# Patient Record
Sex: MALE | Race: WHITE | NOT HISPANIC OR LATINO | Employment: FULL TIME | URBAN - METROPOLITAN AREA
[De-identification: names, ages, dates, MRNs, and addresses within clinical notes are randomized per-mention and may not be internally consistent; named-entity substitution may affect disease eponyms.]

---

## 2024-02-14 ENCOUNTER — HOSPITAL ENCOUNTER (EMERGENCY)
Facility: HOSPITAL | Age: 24
Discharge: HOME | End: 2024-02-14
Payer: COMMERCIAL

## 2024-02-14 ENCOUNTER — APPOINTMENT (OUTPATIENT)
Dept: RADIOLOGY | Facility: HOSPITAL | Age: 24
End: 2024-02-14
Payer: COMMERCIAL

## 2024-02-14 ENCOUNTER — PHARMACY VISIT (OUTPATIENT)
Dept: PHARMACY | Facility: CLINIC | Age: 24
End: 2024-02-14
Payer: COMMERCIAL

## 2024-02-14 VITALS
TEMPERATURE: 98.6 F | BODY MASS INDEX: 22.5 KG/M2 | HEART RATE: 88 BPM | HEIGHT: 66 IN | DIASTOLIC BLOOD PRESSURE: 88 MMHG | WEIGHT: 140 LBS | OXYGEN SATURATION: 100 % | RESPIRATION RATE: 16 BRPM | SYSTOLIC BLOOD PRESSURE: 130 MMHG

## 2024-02-14 DIAGNOSIS — S61.209A AVULSION OF FINGERTIP, INITIAL ENCOUNTER: Primary | ICD-10-CM

## 2024-02-14 DIAGNOSIS — Z23 ENCOUNTER FOR ADMINISTRATION OF VACCINE: ICD-10-CM

## 2024-02-14 DIAGNOSIS — S61.303A AVULSION OF NAIL OF LEFT MIDDLE FINGER: ICD-10-CM

## 2024-02-14 PROCEDURE — 99284 EMERGENCY DEPT VISIT MOD MDM: CPT | Mod: 25

## 2024-02-14 PROCEDURE — 99283 EMERGENCY DEPT VISIT LOW MDM: CPT | Mod: 25

## 2024-02-14 PROCEDURE — 73140 X-RAY EXAM OF FINGER(S): CPT | Mod: LEFT SIDE | Performed by: RADIOLOGY

## 2024-02-14 PROCEDURE — RXMED WILLOW AMBULATORY MEDICATION CHARGE

## 2024-02-14 PROCEDURE — 2500000001 HC RX 250 WO HCPCS SELF ADMINISTERED DRUGS (ALT 637 FOR MEDICARE OP): Performed by: NURSE PRACTITIONER

## 2024-02-14 PROCEDURE — 90715 TDAP VACCINE 7 YRS/> IM: CPT | Performed by: NURSE PRACTITIONER

## 2024-02-14 PROCEDURE — 2500000005 HC RX 250 GENERAL PHARMACY W/O HCPCS

## 2024-02-14 PROCEDURE — 73140 X-RAY EXAM OF FINGER(S): CPT | Mod: LT

## 2024-02-14 PROCEDURE — 90471 IMMUNIZATION ADMIN: CPT | Performed by: NURSE PRACTITIONER

## 2024-02-14 PROCEDURE — 2500000004 HC RX 250 GENERAL PHARMACY W/ HCPCS (ALT 636 FOR OP/ED): Performed by: NURSE PRACTITIONER

## 2024-02-14 RX ORDER — BACITRACIN ZINC 500 UNIT/G
OINTMENT IN PACKET (EA) TOPICAL ONCE
Status: COMPLETED | OUTPATIENT
Start: 2024-02-14 | End: 2024-02-14

## 2024-02-14 RX ORDER — CEPHALEXIN 500 MG/1
500 CAPSULE ORAL 4 TIMES DAILY
Qty: 28 CAPSULE | Refills: 0 | Status: SHIPPED | OUTPATIENT
Start: 2024-02-14 | End: 2024-02-21

## 2024-02-14 RX ORDER — IBUPROFEN 600 MG/1
600 TABLET ORAL EVERY 6 HOURS PRN
Qty: 20 TABLET | Refills: 0 | Status: SHIPPED | OUTPATIENT
Start: 2024-02-14 | End: 2024-02-19

## 2024-02-14 RX ADMIN — IBUPROFEN 600 MG: 200 TABLET, FILM COATED ORAL at 13:58

## 2024-02-14 RX ADMIN — BACITRACIN 1 APPLICATION: 500 OINTMENT TOPICAL at 13:58

## 2024-02-14 RX ADMIN — TETANUS TOXOID, REDUCED DIPHTHERIA TOXOID AND ACELLULAR PERTUSSIS VACCINE, ADSORBED 0.5 ML: 5; 2.5; 8; 8; 2.5 SUSPENSION INTRAMUSCULAR at 13:58

## 2024-02-14 RX ADMIN — Medication 1 EACH: at 14:00

## 2024-02-14 ASSESSMENT — COLUMBIA-SUICIDE SEVERITY RATING SCALE - C-SSRS
6. HAVE YOU EVER DONE ANYTHING, STARTED TO DO ANYTHING, OR PREPARED TO DO ANYTHING TO END YOUR LIFE?: NO
2. HAVE YOU ACTUALLY HAD ANY THOUGHTS OF KILLING YOURSELF?: NO
1. IN THE PAST MONTH, HAVE YOU WISHED YOU WERE DEAD OR WISHED YOU COULD GO TO SLEEP AND NOT WAKE UP?: NO

## 2024-02-14 ASSESSMENT — VISUAL ACUITY: OU: 1

## 2024-02-14 NOTE — ED PROVIDER NOTES
"HPI   Chief Complaint   Patient presents with    Finger Laceration     Middle left finger got lacerated by a vet . A third of the finger tip is missing and collected in a specimen bag with ice.        Patient presents the emergency department for evaluation of a fingertip injury that occurred just prior to arrival.  Patient is left-hand dominant and was using a device similar to a  to open a plastic bottle when he accidentally lacerated his fingertip.  He denies any limited range of motion of his finger secondary to his injury.  He has been able to control bleeding with direct pressure.  He denies being a diabetic or on any blood thinners but admits to needing a tetanus vaccine as he has not had 1 in the last 10 years.  He states he did not hit the bone.  He has not taken any over-the-counter medication for discomfort prior to arrival which is aggravated by bumping his finger.  His symptoms are moderate in severity and persistent nature.      History provided by:  Patient   used: No      INFORMED PHOTO CONSENT:    The patient has given verbal consent to have photos taken of left 3rd finger and inserted into their provider note as a part of their permanent medical record for purposes of documentation, treatment management, and/or medical review. All images taken were transmitted and stored on a secure Epic  Site located within a Media Folder Tab by a registered Epic-[employer] Mobile Application Device. See \"Media\" tab in Epic or photo as below.                    Pavel Coma Scale Score: 15                     Patient History   Past Medical History:   Diagnosis Date    Acute pharyngitis, unspecified 09/22/2016    Sore throat    Enteroviral vesicular stomatitis with exanthem 09/22/2016    Hand, foot and mouth disease    Personal history of other diseases of the respiratory system 08/31/2017    History of asthma    Personal history of other diseases of urinary system     " History of hematuria    Personal history of other specified conditions 08/04/2017    History of dysuria    Personal history of other specified conditions 08/04/2017    History of gross hematuria    Unspecified asthma with (acute) exacerbation 02/12/2015    Asthma with exacerbation    Unspecified complication of genitourinary prosthetic device, implant and graft, sequela 08/31/2017    Complication of Estrada catheter, sequela     No past surgical history on file.  No family history on file.  Social History     Tobacco Use    Smoking status: Not on file    Smokeless tobacco: Not on file   Substance Use Topics    Alcohol use: Not on file    Drug use: Not on file       Physical Exam   ED Triage Vitals [02/14/24 1158]   Temperature Heart Rate Respirations BP   37 °C (98.6 °F) 89 17 (!) 149/96      Pulse Ox Temp Source Heart Rate Source Patient Position   100 % Temporal Monitor Sitting      BP Location FiO2 (%)     Left arm --       Physical Exam  Vitals reviewed.   Constitutional:       Appearance: Normal appearance.      Comments: Bloody dressing noticed to the left middle finger.   HENT:      Head: Normocephalic and atraumatic.      Right Ear: Hearing normal.      Left Ear: Hearing normal.      Nose: Nose normal.      Mouth/Throat:      Lips: Pink.      Mouth: Mucous membranes are moist.   Eyes:      General: Vision grossly intact.   Cardiovascular:      Rate and Rhythm: Normal rate and regular rhythm.      Pulses:           Radial pulses are 2+ on the left side.   Pulmonary:      Effort: Pulmonary effort is normal.      Breath sounds: Normal breath sounds.   Musculoskeletal:      Cervical back: Normal range of motion and neck supple.      Comments: Strong left radial and ulnar pulses.  Normal capillary refill.  Full sensation and motor movement intact along the radial, median and ulnar nerve distribution. Neurovascularly intact with compartments soft.  There is a 1.5 cm x 1.3 cm skin avulsion to the radial aspect of the  left third long finger with oozing of blood.  There is nail involvement with the nail seated well in the eponychium.  Patient has tenderness to palpation of this area.  There is no obvious bone exposure as depicted below.  Otherwise he does not have any bony tenderness to the rest of the finger or throughout the other fingers hand or wrist.  Otherwise full range of motion and strong against resistance with flexion, extension, abduction and adduction of the long finger.     Skin:     General: Skin is warm and dry.      Capillary Refill: Capillary refill takes less than 2 seconds.      Findings: Laceration (left 3rd fingertip avulsion) present.   Neurological:      Mental Status: He is alert and oriented to person, place, and time.   Psychiatric:         Behavior: Behavior is cooperative.         ED Course & MDM   ED Course as of 02/14/24 2040 Wed Feb 14, 2024   1417 Bedside wound irrigation, application of Surgifoam with pressure dressing, metal finger splint and elevation with ice pack as performed by myself.  Will reevaluate the wound for saturation of the dressing prior to discharge.  Wet read of the x-ray on x-ray machine completed by myself does not show obvious bone involvement. [NA]   1431 Patient's wound rechecked and there is no evidence of bleeding through his dressing.  He was reinstructed on his [facility] documentation for completion so he may be discharged. [NA]   1527 To check patient 1 last time and he did start bleeding through his dressing.  I went to reinforce the dressing in which the Surgifoam, 2 x 2 and first couple layers of Ace wrap are saturated.  I left the Surgifoam in place and reinforced with a second surgical foam, 2 x 2's, 4 x 4 and new Ace wrap pressure dressing with the metal finger splint.  Placed patient in recliner G with hand elevated and ice applied.  Discharge held for reevaluation of the wound. [NA]   1540 Wound check shows no bleeding through the second dressing.  Patient's hand  was taken down from elevation and ice removed.  Will recheck in 15 minutes to ensure not bleeding prior to discharge. [NA]   7035 Patient is verbalizing readiness for discharge.  He does not appear to have any bleeding through his dressing.  He is aware signs and symptoms to watch for and is discharged. [NA]      ED Course User Index  [NA] Charlene Martinez, APRN-CNP         Diagnoses as of 02/14/24 2040   Avulsion of fingertip, initial encounter   Avulsion of nail of left middle finger   Encounter for administration of vaccine       Medical Decision Making  Patient presents for evaluation of a wound. Bleeding is controlled and patient is not on anticoagulation. There is clinical evidence warranting diagnostic imaging as there is no tendon injury and no foreign body sensation however do need to evaluate proximity to the bone. Tetanus vaccination is not up to date. Plan is for tetanus booster, dose of ibuprofen, x-ray of the finger and to reestablish bleeding control after irrigation of the wound, cleansing and application of a Surgifoam bacitracin dressing with pressure dressing and metal finger splint.  Patient provides consent for this plan.    X-ray as interpreted by radiologist shows no acute bony abnormality or foreign body.    Wound care and dressing as documented below.  Plan is for cephalexin 4 times daily for the next 7 days and wound care referral was placed.  Patient to be evaluated by wound care first and then to follow-up with occupational medicine.  He was given work restrictions and his first reported injury was completed.  He will leave his quick clot dressing on for 48 hours and if not seen by wound care, he was sent with Surgifoam for repeat dressing change as he witnessed should he need to complete it again.  Patient is aware of S/S indicative of re-evaluation in the ER setting. Patient verbalizes understanding of instructions and departed in stable condition with no social determinants of health  that would obscure this treatment plan.           Procedure    TIME: 1415    WOUND CARE    PERFORMED BY: MICH WALKER CNP  CONSENT: The risks and benefits of the procedure were explained to the patient.  Verbal consent was obtained.    LOCATION: left middle finger  LENGTH: 1.5 cm x 1.3 cm    Anesthesia was not necessary and the patient was given ibuprofen for discomfort.  The area was irrigated with a copious amount of sterile saline.  There was no evidence of tendon injury or foreign body.  The site was then dressed with a bacitracin Surgifoam dressing with 2 x 2's over top secured with a pressure Ace wrap at the tip then loosely secured metal finger splint to the rest of the finger and hand.  The patient tolerated this well.  The hand was then elevated with an ice pack applied.  To be reevaluated with rechecks as documented under ED course.       JOSEPHINE Mcguire-SHARATH  02/14/24 2040

## 2024-02-14 NOTE — DISCHARGE INSTRUCTIONS
Leave your dressing on for the next 48 hours.  Should you not have an appointment to be rechecked by 5 PM on February 16, remove the dressing, wash your hands with soap and water, pat dry and apply an over-the-counter topical antibiotic cream and new nonstick dressing.  Should it bleed like it previously was, you may cut a small piece of the foam dressing and wrap as we did in the emergency department.    WOUND CARE PATIENT INSTRUCTIONS:    GENERAL INFORMATION:   A wound is a break in the skin.  There are several types of wounds.  Abrasions occur when the outer layer of the skin is rubbed or scraped off.  Lacerations are cuts in the skin.  Puncture wounds are holes that are made by round, sharp objects such as needles or nails.  It may have been necessary to close the wound with stitches (sutures) to speed healing and to prevent infection.  Using stitches also will decrease the amount of scarring.    INSTRUCTIONS:   1.  Wash the area with soap and water twice daily, pat it dry.    2.  Apply antibiotic ointment to the area twice daily after cleansing.    3.  Cover with a Band-Aid or sterile dressing.      CONTACT YOUR DOCTOR OR GOTO THE EMERGENCY DEPARTMENT IF:  1. You have a temperature over 100.4 F (38 C).    2. You have signs of infection such as increasing pain or soreness, swelling, redness, pus, a foul smell, or red   streaks coming from the injured site.    3. You have numbness or swelling below the wound, or you can't move the joint below.     INFORMATION FROM UP TO DATE - ALL RIGHTS RESERVED

## 2024-02-20 ENCOUNTER — OFFICE VISIT (OUTPATIENT)
Dept: WOUND CARE | Facility: CLINIC | Age: 24
End: 2024-02-20
Payer: COMMERCIAL

## 2024-02-20 PROCEDURE — 99213 OFFICE O/P EST LOW 20 MIN: CPT | Mod: 25

## 2024-02-20 PROCEDURE — 11042 DBRDMT SUBQ TIS 1ST 20SQCM/<: CPT | Mod: ZK

## 2024-02-20 PROCEDURE — 11042 DBRDMT SUBQ TIS 1ST 20SQCM/<: CPT | Performed by: CLINICAL NURSE SPECIALIST

## 2024-02-20 PROCEDURE — 99203 OFFICE O/P NEW LOW 30 MIN: CPT | Performed by: CLINICAL NURSE SPECIALIST

## 2024-02-27 ENCOUNTER — OFFICE VISIT (OUTPATIENT)
Dept: WOUND CARE | Facility: CLINIC | Age: 24
End: 2024-02-27
Payer: COMMERCIAL

## 2024-02-27 PROCEDURE — 11042 DBRDMT SUBQ TIS 1ST 20SQCM/<: CPT | Performed by: CLINICAL NURSE SPECIALIST

## 2024-02-27 PROCEDURE — 11042 DBRDMT SUBQ TIS 1ST 20SQCM/<: CPT | Mod: ZK

## 2024-03-05 ENCOUNTER — OFFICE VISIT (OUTPATIENT)
Dept: WOUND CARE | Facility: CLINIC | Age: 24
End: 2024-03-05
Payer: COMMERCIAL

## 2024-03-05 PROCEDURE — 11042 DBRDMT SUBQ TIS 1ST 20SQCM/<: CPT | Performed by: CLINICAL NURSE SPECIALIST

## 2024-03-05 PROCEDURE — 11042 DBRDMT SUBQ TIS 1ST 20SQCM/<: CPT | Mod: ZK

## 2024-03-12 ENCOUNTER — OFFICE VISIT (OUTPATIENT)
Dept: WOUND CARE | Facility: CLINIC | Age: 24
End: 2024-03-12
Payer: COMMERCIAL

## 2024-03-12 PROCEDURE — 99212 OFFICE O/P EST SF 10 MIN: CPT

## 2024-03-12 PROCEDURE — 99212 OFFICE O/P EST SF 10 MIN: CPT | Performed by: CLINICAL NURSE SPECIALIST

## 2024-03-19 ENCOUNTER — APPOINTMENT (OUTPATIENT)
Dept: WOUND CARE | Facility: CLINIC | Age: 24
End: 2024-03-19
Payer: COMMERCIAL

## 2024-10-08 ENCOUNTER — APPOINTMENT (OUTPATIENT)
Dept: URGENT CARE | Age: 24
End: 2024-10-08

## 2025-02-06 ENCOUNTER — APPOINTMENT (OUTPATIENT)
Dept: RADIOLOGY | Facility: HOSPITAL | Age: 25
End: 2025-02-06

## 2025-02-06 ENCOUNTER — HOSPITAL ENCOUNTER (EMERGENCY)
Facility: HOSPITAL | Age: 25
Discharge: HOME | End: 2025-02-06

## 2025-02-06 VITALS
TEMPERATURE: 97.7 F | RESPIRATION RATE: 16 BRPM | SYSTOLIC BLOOD PRESSURE: 110 MMHG | HEART RATE: 58 BPM | HEIGHT: 66 IN | DIASTOLIC BLOOD PRESSURE: 70 MMHG | WEIGHT: 130 LBS | BODY MASS INDEX: 20.89 KG/M2 | OXYGEN SATURATION: 99 %

## 2025-02-06 DIAGNOSIS — R10.84 GENERALIZED ABDOMINAL PAIN: Primary | ICD-10-CM

## 2025-02-06 LAB
ALBUMIN SERPL BCP-MCNC: 5.1 G/DL (ref 3.4–5)
ALP SERPL-CCNC: 59 U/L (ref 33–120)
ALT SERPL W P-5'-P-CCNC: 16 U/L (ref 10–52)
ANION GAP SERPL CALC-SCNC: 12 MMOL/L (ref 10–20)
AST SERPL W P-5'-P-CCNC: 18 U/L (ref 9–39)
BASOPHILS # BLD AUTO: 0.02 X10*3/UL (ref 0–0.1)
BASOPHILS NFR BLD AUTO: 0.4 %
BILIRUB SERPL-MCNC: 1.5 MG/DL (ref 0–1.2)
BUN SERPL-MCNC: 17 MG/DL (ref 6–23)
CALCIUM SERPL-MCNC: 9.8 MG/DL (ref 8.6–10.3)
CHLORIDE SERPL-SCNC: 101 MMOL/L (ref 98–107)
CO2 SERPL-SCNC: 29 MMOL/L (ref 21–32)
CREAT SERPL-MCNC: 0.94 MG/DL (ref 0.5–1.3)
EGFRCR SERPLBLD CKD-EPI 2021: >90 ML/MIN/1.73M*2
EOSINOPHIL # BLD AUTO: 0.12 X10*3/UL (ref 0–0.7)
EOSINOPHIL NFR BLD AUTO: 2.4 %
ERYTHROCYTE [DISTWIDTH] IN BLOOD BY AUTOMATED COUNT: 12.1 % (ref 11.5–14.5)
GLUCOSE SERPL-MCNC: 72 MG/DL (ref 74–99)
HCT VFR BLD AUTO: 46.9 % (ref 41–52)
HGB BLD-MCNC: 16.4 G/DL (ref 13.5–17.5)
IMM GRANULOCYTES # BLD AUTO: 0 X10*3/UL (ref 0–0.7)
IMM GRANULOCYTES NFR BLD AUTO: 0 % (ref 0–0.9)
LACTATE SERPL-SCNC: 0.6 MMOL/L (ref 0.4–2)
LIPASE SERPL-CCNC: 20 U/L (ref 9–82)
LYMPHOCYTES # BLD AUTO: 1.52 X10*3/UL (ref 1.2–4.8)
LYMPHOCYTES NFR BLD AUTO: 29.8 %
MCH RBC QN AUTO: 29.2 PG (ref 26–34)
MCHC RBC AUTO-ENTMCNC: 35 G/DL (ref 32–36)
MCV RBC AUTO: 84 FL (ref 80–100)
MONOCYTES # BLD AUTO: 0.36 X10*3/UL (ref 0.1–1)
MONOCYTES NFR BLD AUTO: 7.1 %
NEUTROPHILS # BLD AUTO: 3.08 X10*3/UL (ref 1.2–7.7)
NEUTROPHILS NFR BLD AUTO: 60.3 %
NRBC BLD-RTO: 0 /100 WBCS (ref 0–0)
PLATELET # BLD AUTO: 236 X10*3/UL (ref 150–450)
POTASSIUM SERPL-SCNC: 3.7 MMOL/L (ref 3.5–5.3)
PROT SERPL-MCNC: 7.5 G/DL (ref 6.4–8.2)
RBC # BLD AUTO: 5.61 X10*6/UL (ref 4.5–5.9)
SODIUM SERPL-SCNC: 138 MMOL/L (ref 136–145)
WBC # BLD AUTO: 5.1 X10*3/UL (ref 4.4–11.3)

## 2025-02-06 PROCEDURE — 74177 CT ABD & PELVIS W/CONTRAST: CPT

## 2025-02-06 PROCEDURE — 74177 CT ABD & PELVIS W/CONTRAST: CPT | Performed by: RADIOLOGY

## 2025-02-06 PROCEDURE — 83605 ASSAY OF LACTIC ACID: CPT | Performed by: NURSE PRACTITIONER

## 2025-02-06 PROCEDURE — 36415 COLL VENOUS BLD VENIPUNCTURE: CPT | Performed by: NURSE PRACTITIONER

## 2025-02-06 PROCEDURE — 99285 EMERGENCY DEPT VISIT HI MDM: CPT | Mod: 25

## 2025-02-06 PROCEDURE — 2550000001 HC RX 255 CONTRASTS: Performed by: NURSE PRACTITIONER

## 2025-02-06 PROCEDURE — 83690 ASSAY OF LIPASE: CPT | Performed by: NURSE PRACTITIONER

## 2025-02-06 PROCEDURE — 80053 COMPREHEN METABOLIC PANEL: CPT | Performed by: NURSE PRACTITIONER

## 2025-02-06 PROCEDURE — 85025 COMPLETE CBC W/AUTO DIFF WBC: CPT | Performed by: NURSE PRACTITIONER

## 2025-02-06 RX ADMIN — IOHEXOL 75 ML: 350 INJECTION, SOLUTION INTRAVENOUS at 12:08

## 2025-02-06 ASSESSMENT — LIFESTYLE VARIABLES
HAVE PEOPLE ANNOYED YOU BY CRITICIZING YOUR DRINKING: NO
EVER FELT BAD OR GUILTY ABOUT YOUR DRINKING: NO
EVER HAD A DRINK FIRST THING IN THE MORNING TO STEADY YOUR NERVES TO GET RID OF A HANGOVER: NO
TOTAL SCORE: 0
HAVE YOU EVER FELT YOU SHOULD CUT DOWN ON YOUR DRINKING: NO

## 2025-02-06 ASSESSMENT — PAIN SCALES - GENERAL: PAINLEVEL_OUTOF10: 0 - NO PAIN

## 2025-02-06 ASSESSMENT — PAIN - FUNCTIONAL ASSESSMENT: PAIN_FUNCTIONAL_ASSESSMENT: 0-10

## 2025-02-06 NOTE — ED PROVIDER NOTES
Chief Complaint   Patient presents with   • Rectal Bleeding       HPI       24 year old male presents to the Emergency Department today complaining of a 2 year history of intermittent diffuse abdominal pain that he describes as a discomfort, and varies in intensity. Notes that that he has had nausea and hematochezia associated with the above. Denies any associated fever, chills, headache, neck pain, chest pain, shortness of breath, vomiting, diarrhea, constipation, hematemesis, melena, or urinary symptoms.       History provided by:  Patient             Patient History   Past Medical History:   Diagnosis Date   • Acute pharyngitis, unspecified 09/22/2016    Sore throat   • Enteroviral vesicular stomatitis with exanthem 09/22/2016    Hand, foot and mouth disease   • Personal history of other diseases of the respiratory system 08/31/2017    History of asthma   • Personal history of other diseases of urinary system     History of hematuria   • Personal history of other specified conditions 08/04/2017    History of dysuria   • Personal history of other specified conditions 08/04/2017    History of gross hematuria   • Unspecified asthma with (acute) exacerbation ([facility]) 02/12/2015    Asthma with exacerbation   • Unspecified complication of genitourinary prosthetic device, implant and graft, sequela 08/31/2017    Complication of Estrada catheter, sequela     No past surgical history on file.  No family history on file.  Social History     Tobacco Use   • Smoking status: Not on file   • Smokeless tobacco: Not on file   Substance Use Topics   • Alcohol use: Not on file   • Drug use: Not on file           Physical Exam  Constitutional:       Appearance: Normal appearance.   HENT:      Head: Normocephalic.      Right Ear: Tympanic membrane, ear canal and external ear normal.      Left Ear: Tympanic membrane, ear canal and external ear normal.      Nose: Nose normal.      Mouth/Throat:      Mouth: Mucous membranes are moist.       Pharynx: Oropharynx is clear. No oropharyngeal exudate or posterior oropharyngeal erythema.   Eyes:      Conjunctiva/sclera: Conjunctivae normal.      Pupils: Pupils are equal, round, and reactive to light.   Cardiovascular:      Rate and Rhythm: Normal rate and regular rhythm.      Pulses:           Radial pulses are 3+ on the right side and 3+ on the left side.        Dorsalis pedis pulses are 3+ on the right side and 3+ on the left side.      Heart sounds: Normal heart sounds. No murmur heard.     No friction rub. No gallop.   Pulmonary:      Effort: Pulmonary effort is normal. No respiratory distress.      Breath sounds: Normal breath sounds. No wheezing, rhonchi or rales.   Abdominal:      General: Abdomen is flat. Bowel sounds are normal.      Palpations: Abdomen is soft.      Tenderness: There is no abdominal tenderness. There is no right CVA tenderness, left CVA tenderness, guarding or rebound. Negative signs include Marc's sign and McBurney's sign.   Musculoskeletal:         General: No swelling or deformity.      Cervical back: Full passive range of motion without pain.      Right lower leg: No edema.      Left lower leg: No edema.   Lymphadenopathy:      Cervical: No cervical adenopathy.   Skin:     Capillary Refill: Capillary refill takes less than 2 seconds.      Coloration: Skin is not jaundiced.      Findings: No rash.   Neurological:      General: No focal deficit present.      Mental Status: He is alert and oriented to person, place, and time. Mental status is at baseline.      Gait: Gait is intact.   Psychiatric:         Mood and Affect: Mood normal.         Behavior: Behavior is cooperative.         Labs Reviewed   COMPREHENSIVE METABOLIC PANEL - Abnormal       Result Value    Glucose 72 (*)     Sodium 138      Potassium 3.7      Chloride 101      Bicarbonate 29      Anion Gap 12      Urea Nitrogen 17      Creatinine 0.94      eGFR >90      Calcium 9.8      Albumin 5.1 (*)     Alkaline  Phosphatase 59      Total Protein 7.5      AST 18      Bilirubin, Total 1.5 (*)     ALT 16     LIPASE - Normal    Lipase 20      Narrative:     Venipuncture immediately after or during the administration of Metamizole may lead to falsely low results. Testing should be performed immediately prior to Metamizole dosing.   LACTATE - Normal    Lactate 0.6      Narrative:     Venipuncture immediately after or during the administration of Metamizole may lead to falsely low results. Testing should be performed immediately prior to Metamizole dosing.   CBC WITH AUTO DIFFERENTIAL    WBC 5.1      nRBC 0.0      RBC 5.61      Hemoglobin 16.4      Hematocrit 46.9      MCV 84      MCH 29.2      MCHC 35.0      RDW 12.1      Platelets 236      Neutrophils % 60.3      Immature Granulocytes %, Automated 0.0      Lymphocytes % 29.8      Monocytes % 7.1      Eosinophils % 2.4      Basophils % 0.4      Neutrophils Absolute 3.08      Immature Granulocytes Absolute, Automated 0.00      Lymphocytes Absolute 1.52      Monocytes Absolute 0.36      Eosinophils Absolute 0.12      Basophils Absolute 0.02         CT abdomen pelvis w IV contrast   Final Result   Small right renal cyst.        Mild diffuse retained colonic stool.        Remainder of the exam was negative.        MACRO:   None        Signed by: Akhil Johnson 2/6/2025 12:32 PM   Dictation workstation:   [id number]               ED Course & MDM   Diagnoses as of 02/06/25 1316   Generalized abdominal pain           Medical Decision Making  Patient was seen and evaluated by myself. Saline lock was established with labs drawn and results as above. Blood counts, electrolytes, kidney function, liver function, lipase, and lactate were all unremarkable. CT scan of his abdomen and pelvis with contrast shows small right renal cyst, mild diffuse retained colonic stool, and remainder of the exam was negative. Repeat abdominal evaluation reveals an abdomen soft, nondistended, and nontender to  palpation. There was no rebound, rigidity, or guarding noted. There were no peritoneal signs noted. Continued to have multiple benign serial abdominal exams. At this time, I find no underlying evidence of acute pancreatitis, cholecystitis, cholangitis, diverticulitis, or appendicitis. Referred to GI for follow up. Return if worse in any way. Discharged in stable condition with computer instructions.     Diagnostic Impression:    1. Acute abdominal pain             Your medication list      You have not been prescribed any medications.           Procedure  Procedures     Wilbert Quinones, JOSEPHINE-CNP  02/06/25 2511